# Patient Record
Sex: MALE | Race: WHITE | Employment: STUDENT | URBAN - METROPOLITAN AREA
[De-identification: names, ages, dates, MRNs, and addresses within clinical notes are randomized per-mention and may not be internally consistent; named-entity substitution may affect disease eponyms.]

---

## 2024-11-18 DIAGNOSIS — M79.645 FINGER PAIN, LEFT: Primary | ICD-10-CM

## 2024-12-10 ENCOUNTER — OFFICE VISIT (OUTPATIENT)
Age: 19
End: 2024-12-10
Payer: COMMERCIAL

## 2024-12-10 DIAGNOSIS — S62.621A CLOSED DISPLACED FRACTURE OF MIDDLE PHALANX OF LEFT INDEX FINGER, INITIAL ENCOUNTER: ICD-10-CM

## 2024-12-10 DIAGNOSIS — M79.645 FINGER PAIN, LEFT: Primary | ICD-10-CM

## 2024-12-10 PROCEDURE — 99204 OFFICE O/P NEW MOD 45 MIN: CPT | Performed by: ORTHOPAEDIC SURGERY

## 2024-12-10 NOTE — PROGRESS NOTES
[facility] Note    Name: Jayme Wilson  YOB: 2005  Age: 19 y.o.  Gender: male  MRN: [id number]      CC: Patient referred for evaluation of upper extremity pain    HPI: Jayme Wilson is a 19 y.o. male Right hand dominant with a chief complaint of left index finger PIP joint injury, symptoms have been going on for 4 weeks, patient reports no pain at this point, he has been able to continue playing sports without issues.      ROS/Meds/PSH/PMH/FH/SH: I personally reviewed the patients standard intake form.  Pertinents are discussed in the HPI    Physical Examination:  General: Awake and alert.  HEENT: Normocephalic, atraumatic  CV/Pulm: Breathing even and unlabored  Skin: No obvious rashes noted.  Lymphatic: No obvious evidence of lymphedema or lymphadenopathy    Musculoskeletal Exam:  Examination on the left upper extremity demonstrates cap refill < 5 seconds in all fingers, mild swelling of the left middle finger especially around the PIP joint, there is no tenderness to palpation whatsoever, PIP joint stable to varus and valgus stress as well as to hyperextension stress, no swan-neck deformity is noted.    Imaging / Electrodiagnostic Tests:     X-ray of the left index finger was reviewed and independently interpreted, this demonstrates an avulsion fracture of the volar lip of the middle phalanx at the PIP joint, negative V sign    left index finger XR: AP, Lateral, Oblique views     Clinical Indication  1. Finger pain, left    2. Closed displaced fracture of middle phalanx of left index finger, initial encounter           Report: AP, Lateral, Oblique XR demonstrates avulsion fracture of the left index finger middle phalanx volar lip at the PIP joint and unchanged alignment, negative V-signed    Impression: as above     Sonam Wade MD         Assessment:   1. Finger pain, left    2. Closed displaced fracture of middle phalanx of left index finger, initial encounter        Plan:   We